# Patient Record
Sex: FEMALE | Race: BLACK OR AFRICAN AMERICAN | NOT HISPANIC OR LATINO
[De-identification: names, ages, dates, MRNs, and addresses within clinical notes are randomized per-mention and may not be internally consistent; named-entity substitution may affect disease eponyms.]

---

## 2022-05-27 ENCOUNTER — APPOINTMENT (OUTPATIENT)
Dept: ORTHOPEDIC SURGERY | Facility: CLINIC | Age: 67
End: 2022-05-27
Payer: MEDICARE

## 2022-05-27 VITALS — BODY MASS INDEX: 34.73 KG/M2 | HEIGHT: 63 IN | WEIGHT: 196 LBS

## 2022-05-27 DIAGNOSIS — Z86.79 PERSONAL HISTORY OF OTHER DISEASES OF THE CIRCULATORY SYSTEM: ICD-10-CM

## 2022-05-27 PROBLEM — Z00.00 ENCOUNTER FOR PREVENTIVE HEALTH EXAMINATION: Status: ACTIVE | Noted: 2022-05-27

## 2022-05-27 PROCEDURE — 99213 OFFICE O/P EST LOW 20 MIN: CPT

## 2022-05-27 RX ORDER — VALSARTAN AND HYDROCHLOROTHIAZIDE 320; 25 MG/1; MG/1
TABLET, FILM COATED ORAL
Refills: 0 | Status: ACTIVE | COMMUNITY

## 2022-05-27 RX ORDER — METAPROTERENOL SULFATE 20 MG/1
TABLET ORAL
Refills: 0 | Status: ACTIVE | COMMUNITY

## 2022-05-27 RX ORDER — CYCLOBENZAPRINE HYDROCHLORIDE 5 MG/1
5 TABLET, FILM COATED ORAL
Qty: 30 | Refills: 0 | Status: COMPLETED | COMMUNITY
Start: 2022-05-27 | End: 2022-06-26

## 2022-05-27 NOTE — DISCUSSION/SUMMARY
[de-identified] : General Dx Discussion\par The patient was advised of the diagnosis. The natural history of the pathology was explained in full to the patient in layman's terms. All questions were answered. The risks and benefits of surgical and non-surgical treatment alternatives were explained in full to the patient.\par \par will get a updated mri to eval for progression of RCT \par f/u after mri \par will try flexeril

## 2022-05-27 NOTE — PHYSICAL EXAM
[Left] : left shoulder [Standing] : standing [Mild] : mild [5 ___] : forward flexion 5[unfilled]/5 [5___] : internal rotation 5[unfilled]/5 [] : negative drop-arm test [TWNoteComboBox7] : active forward flexion 140 degrees [de-identified] : active abduction 130 degrees [TWNoteComboBox6] : internal rotation 60 degrees [de-identified] : external rotation 70 degrees

## 2022-05-27 NOTE — HISTORY OF PRESENT ILLNESS
[Left Arm] : left arm [9] : 9 [5] : 5 [Burning] : burning [Dull/Aching] : dull/aching [Stabbing] : stabbing [] : Post Surgical Visit: no [FreeTextEntry1] : left shoulder  [de-identified] : none

## 2022-05-27 NOTE — REASON FOR VISIT
[FreeTextEntry2] : pt is here to follow up on their left shoulder pain has increased for the past month

## 2022-06-13 ENCOUNTER — APPOINTMENT (OUTPATIENT)
Dept: ORTHOPEDIC SURGERY | Facility: CLINIC | Age: 67
End: 2022-06-13

## 2022-06-13 ENCOUNTER — APPOINTMENT (OUTPATIENT)
Dept: MRI IMAGING | Facility: CLINIC | Age: 67
End: 2022-06-13

## 2022-06-24 ENCOUNTER — APPOINTMENT (OUTPATIENT)
Dept: ORTHOPEDIC SURGERY | Facility: CLINIC | Age: 67
End: 2022-06-24
Payer: MEDICARE

## 2022-06-24 VITALS — HEIGHT: 63 IN | WEIGHT: 196 LBS | BODY MASS INDEX: 34.73 KG/M2

## 2022-06-24 DIAGNOSIS — Z80.9 FAMILY HISTORY OF MALIGNANT NEOPLASM, UNSPECIFIED: ICD-10-CM

## 2022-06-24 DIAGNOSIS — Z82.49 FAMILY HISTORY OF ISCHEMIC HEART DISEASE AND OTHER DISEASES OF THE CIRCULATORY SYSTEM: ICD-10-CM

## 2022-06-24 DIAGNOSIS — Z78.9 OTHER SPECIFIED HEALTH STATUS: ICD-10-CM

## 2022-06-24 PROCEDURE — 99213 OFFICE O/P EST LOW 20 MIN: CPT

## 2022-06-24 RX ORDER — EZETIMIBE 10 MG/1
10 TABLET ORAL
Qty: 90 | Refills: 0 | Status: ACTIVE | COMMUNITY
Start: 2022-04-27

## 2022-06-24 RX ORDER — LEVOCETIRIZINE DIHYDROCHLORIDE 5 MG/1
5 TABLET ORAL
Qty: 20 | Refills: 0 | Status: ACTIVE | COMMUNITY
Start: 2022-05-13

## 2022-06-24 RX ORDER — PRAVASTATIN SODIUM 40 MG/1
40 TABLET ORAL
Qty: 90 | Refills: 0 | Status: ACTIVE | COMMUNITY
Start: 2022-06-21

## 2022-06-24 RX ORDER — CYCLOBENZAPRINE HYDROCHLORIDE 5 MG/1
5 TABLET, FILM COATED ORAL
Qty: 30 | Refills: 0 | Status: ACTIVE | COMMUNITY
Start: 2022-06-24 | End: 1900-01-01

## 2022-06-24 RX ORDER — LEVOTHYROXINE SODIUM 0.07 MG/1
75 TABLET ORAL
Qty: 90 | Refills: 0 | Status: ACTIVE | COMMUNITY
Start: 2022-06-21

## 2022-06-24 NOTE — HISTORY OF PRESENT ILLNESS
[Left Arm] : left arm [7] : 7 [2] : 2 [Dull/Aching] : dull/aching [] : Post Surgical Visit: no [FreeTextEntry1] : left shoulder  [de-identified] : none

## 2022-06-24 NOTE — PHYSICAL EXAM
[Left] : left shoulder [Standing] : standing [Mild] : mild [5 ___] : forward flexion 5[unfilled]/5 [5___] : internal rotation 5[unfilled]/5 [] : negative drop-arm test [TWNoteComboBox7] : active forward flexion 140 degrees [de-identified] : active abduction 130 degrees [TWNoteComboBox6] : internal rotation 60 degrees [de-identified] : external rotation 70 degrees

## 2022-06-24 NOTE — DISCUSSION/SUMMARY
[de-identified] : General Dx discussion\par The patient was advised of the diagnosis. The natural history of the pathology was explained in full to the patient in layman's terms. All questions were answered. The risks and benefits of surgical and non-surgical treatment alternatives were explained in full to the patient. \par \par MRI pictures and report reviewed.\par Recommend home stretching program.\par Flexeril as needed.\par Followup in 2 months.\par \par Entered by KIM Allen acting as scribe.\par \par -\par The documentation recorded by the scribe accurately reflects the service I personally performed and the decisions made by me.\par

## 2022-08-29 ENCOUNTER — RX RENEWAL (OUTPATIENT)
Age: 67
End: 2022-08-29

## 2022-09-02 ENCOUNTER — APPOINTMENT (OUTPATIENT)
Dept: ORTHOPEDIC SURGERY | Facility: CLINIC | Age: 67
End: 2022-09-02

## 2022-09-07 ENCOUNTER — RX RENEWAL (OUTPATIENT)
Age: 67
End: 2022-09-07

## 2023-02-17 ENCOUNTER — APPOINTMENT (OUTPATIENT)
Dept: ORTHOPEDIC SURGERY | Facility: CLINIC | Age: 68
End: 2023-02-17
Payer: MEDICARE

## 2023-02-17 VITALS — HEIGHT: 63 IN | WEIGHT: 196 LBS | BODY MASS INDEX: 34.73 KG/M2

## 2023-02-17 PROCEDURE — 73564 X-RAY EXAM KNEE 4 OR MORE: CPT | Mod: 50

## 2023-02-17 PROCEDURE — 99214 OFFICE O/P EST MOD 30 MIN: CPT

## 2023-02-17 RX ORDER — HYALURONATE SODIUM 30 MG/2 ML
30 SYRINGE (ML) INTRAARTICULAR
Qty: 8 | Refills: 0 | Status: COMPLETED | COMMUNITY
Start: 2023-02-17 | End: 2023-03-17

## 2023-02-17 NOTE — PHYSICAL EXAM
[Left] : left shoulder [Standing] : standing [Mild] : mild [5 ___] : forward flexion 5[unfilled]/5 [5___] : internal rotation 5[unfilled]/5 [Bilateral] : knee bilaterally [TWNoteComboBox6] : internal rotation 60 degrees [de-identified] : external rotation 70 degrees [] : non-antalgic [TWNoteComboBox7] : flexion 100 degrees [de-identified] : extension 5 degrees

## 2023-02-17 NOTE — DISCUSSION/SUMMARY
[de-identified] : General Dx discussion\par The patient was advised of the diagnosis. The natural history of the pathology was explained in full to the patient in layman's terms. All questions were answered. The risks and benefits of surgical and non-surgical treatment alternatives were explained in full to the patient. \par \par Case discussed.\par She will be sent for an MRI of her shoulder to eval the partial tear seen in 2021/ R/O a complete tear.\par Recommend repeating Supartz injections for her knees.  Auth requested.\par Follow up after MRI and with Supartz auth to begin series.\par \par Entered by KMI Allen acting as scribe.\par -\par The documentation recorded by the scribe accurately reflects the service I personally performed and the decisions made by me.\par

## 2023-02-17 NOTE — HISTORY OF PRESENT ILLNESS
[9] : 9 [5] : 5 [Dull/Aching] : dull/aching [Constant] : constant [Rest] : rest [] : Post Surgical Visit: no [FreeTextEntry1] : left shoulder [FreeTextEntry5] : Patient is here today for follow up left shoulder. Symptoms continue. Patient states pain and soreness that radiates to the neck [FreeTextEntry6] : soreness [FreeTextEntry9] : tylenol [de-identified] : motion

## 2023-02-17 NOTE — REASON FOR VISIT
[FreeTextEntry2] : follow up left shoulder. She continues with pain which sometimes wakes her at night. Flexeril did not help.Had a gel one injection in May 2021 which helped. MRI from 9/21 showed a partial RTC tear.Also here for bilateral knee pain. Has had Supartz in the past with good relief of symptoms. Last series was in October of 2021.

## 2023-03-09 RX ORDER — HYLAN G-F 20 16MG/2ML
16 SYRINGE (ML) INTRAARTICULAR WEEKLY
Qty: 6 | Refills: 0 | Status: COMPLETED | COMMUNITY
Start: 2023-03-09 | End: 2023-03-30

## 2023-03-30 ENCOUNTER — APPOINTMENT (OUTPATIENT)
Dept: ORTHOPEDIC SURGERY | Facility: CLINIC | Age: 68
End: 2023-03-30
Payer: MEDICARE

## 2023-03-30 VITALS — WEIGHT: 189 LBS | HEIGHT: 63 IN | BODY MASS INDEX: 33.49 KG/M2

## 2023-03-30 DIAGNOSIS — M75.02 ADHESIVE CAPSULITIS OF LEFT SHOULDER: ICD-10-CM

## 2023-03-30 PROCEDURE — 20610 DRAIN/INJ JOINT/BURSA W/O US: CPT | Mod: 50

## 2023-03-30 PROCEDURE — 99212 OFFICE O/P EST SF 10 MIN: CPT | Mod: 25

## 2023-03-30 NOTE — HISTORY OF PRESENT ILLNESS
[1] : 1 [Synvisc] : Synvisc [9] : 9 [5] : 5 [Dull/Aching] : dull/aching [Constant] : constant [Rest] : rest [de-identified] : #1 synvisc for bilateral knees [] : Post Surgical Visit: no [FreeTextEntry1] : left shoulder [FreeTextEntry5] : Patient is here today for follow up left shoulder. Symptoms continue. Patient states pain and soreness that radiates to the neck [FreeTextEntry6] : soreness [FreeTextEntry9] : tylenol [de-identified] : motion

## 2023-03-30 NOTE — DISCUSSION/SUMMARY
[de-identified] : General Dx discussion\par The patient was advised of the diagnosis. The natural history of the pathology was explained in full to the patient in layman's terms. All questions were answered. The risks and benefits of surgical and non-surgical treatment alternatives were explained in full to the patient. \par \par Case Discussed.\par She will be sent for an MRI of her shoulder to eval the partial tear seen in 2021/ R/O a complete tear.\par Synvisc today b/l knees and tolerated well. \par \par Entered by KIM Martínez acting as scribe.\par -\par The documentation recorded by the scribe accurately reflects the service I personally performed and the decisions made by me.\par

## 2023-03-30 NOTE — PHYSICAL EXAM
[Left] : left shoulder [Standing] : standing [Mild] : mild [5 ___] : forward flexion 5[unfilled]/5 [5___] : internal rotation 5[unfilled]/5 [Bilateral] : knee bilaterally [TWNoteComboBox6] : internal rotation 60 degrees [de-identified] : external rotation 70 degrees [] : non-antalgic [TWNoteComboBox7] : flexion 100 degrees [de-identified] : extension 5 degrees

## 2023-03-30 NOTE — PROCEDURE
[Large Joint Injection] : Large joint injection [Bilateral] : bilaterally of the [Knee] : knee [Pain] : pain [Inflammation] : inflammation [X-ray evidence of Osteoarthritis on this or prior visit] : x-ray evidence of Osteoarthritis on this or prior visit [Alcohol] : alcohol [Betadine] : betadine [Ethyl Chloride sprayed topically] : ethyl chloride sprayed topically [Sterile technique used] : sterile technique used [Synvisc (16mg)] : 16mg of Synvisc [#1] : series #1 [] : Patient tolerated procedure well [Call if redness, pain or fever occur] : call if redness, pain or fever occur [Apply ice for 15min out of every hour for the next 12-24 hours as tolerated] : apply ice for 15 minutes out of every hour for the next 12-24 hours as tolerated [Previous OTC use and PT nontherapeutic] : patient has tried OTC's including aspirin, Ibuprofen, Aleve, etc or prescription NSAIDS, and/or exercises at home and/or physical therapy without satisfactory response [Patient had decreased mobility in the joint] : patient had decreased mobility in the joint [Risks, benefits, alternatives discussed / Verbal consent obtained] : the risks benefits, and alternatives have been discussed, and verbal consent was obtained [Prior failure or difficult injection] : prior failure or difficult injection [All ultrasound images have been permanently captured and stored accordingly in our picture archiving and communication system] : All ultrasound images have been permanently captured and stored accordingly in our picture archiving and communication system

## 2023-04-05 ENCOUNTER — APPOINTMENT (OUTPATIENT)
Dept: MRI IMAGING | Facility: CLINIC | Age: 68
End: 2023-04-05

## 2023-04-06 ENCOUNTER — APPOINTMENT (OUTPATIENT)
Dept: ORTHOPEDIC SURGERY | Facility: CLINIC | Age: 68
End: 2023-04-06
Payer: MEDICARE

## 2023-04-06 VITALS — HEIGHT: 63 IN | BODY MASS INDEX: 33.49 KG/M2 | WEIGHT: 189 LBS

## 2023-04-06 PROCEDURE — 20611 DRAIN/INJ JOINT/BURSA W/US: CPT | Mod: 50

## 2023-04-06 PROCEDURE — 99024 POSTOP FOLLOW-UP VISIT: CPT

## 2023-04-06 NOTE — DISCUSSION/SUMMARY
[de-identified] : General Dx discussion\par The patient was advised of the diagnosis. The natural history of the pathology was explained in full to the patient in layman's terms. All questions were answered. The risks and benefits of surgical and non-surgical treatment alternatives were explained in full to the patient. \par \par Case Discussed.\par Synvisc today b/l knees and tolerated well. \par f/u 1 week to continue series. \par \par Entered by KIM Martínez acting as scribe.\par -\par The documentation recorded by the scribe accurately reflects the service I personally performed and the decisions made by me.\par

## 2023-04-06 NOTE — PROCEDURE
[Large Joint Injection] : Large joint injection [Bilateral] : bilaterally of the [Knee] : knee [Pain] : pain [Inflammation] : inflammation [X-ray evidence of Osteoarthritis on this or prior visit] : x-ray evidence of Osteoarthritis on this or prior visit [Alcohol] : alcohol [Betadine] : betadine [Ethyl Chloride sprayed topically] : ethyl chloride sprayed topically [Sterile technique used] : sterile technique used [Synvisc (16mg)] : 16mg of Synvisc [#2] : series #2 [] : Patient tolerated procedure well [Call if redness, pain or fever occur] : call if redness, pain or fever occur [Apply ice for 15min out of every hour for the next 12-24 hours as tolerated] : apply ice for 15 minutes out of every hour for the next 12-24 hours as tolerated [Previous OTC use and PT nontherapeutic] : patient has tried OTC's including aspirin, Ibuprofen, Aleve, etc or prescription NSAIDS, and/or exercises at home and/or physical therapy without satisfactory response [Patient had decreased mobility in the joint] : patient had decreased mobility in the joint [Risks, benefits, alternatives discussed / Verbal consent obtained] : the risks benefits, and alternatives have been discussed, and verbal consent was obtained [Prior failure or difficult injection] : prior failure or difficult injection [All ultrasound images have been permanently captured and stored accordingly in our picture archiving and communication system] : All ultrasound images have been permanently captured and stored accordingly in our picture archiving and communication system

## 2023-04-06 NOTE — HISTORY OF PRESENT ILLNESS
[3] : 3 [de-identified] : synvisc for bilateral knees [] : no [de-identified] : 3/30/23 [TWNoteComboBox1] : 30%

## 2023-04-06 NOTE — PHYSICAL EXAM
[Left] : left shoulder [Standing] : standing [Mild] : mild [5 ___] : forward flexion 5[unfilled]/5 [5___] : internal rotation 5[unfilled]/5 [Bilateral] : knee bilaterally [TWNoteComboBox6] : internal rotation 60 degrees [de-identified] : external rotation 70 degrees [] : non-antalgic [TWNoteComboBox7] : flexion 100 degrees [de-identified] : extension 5 degrees

## 2023-04-24 ENCOUNTER — APPOINTMENT (OUTPATIENT)
Dept: ORTHOPEDIC SURGERY | Facility: CLINIC | Age: 68
End: 2023-04-24
Payer: MEDICARE

## 2023-04-24 VITALS — WEIGHT: 189 LBS | BODY MASS INDEX: 33.49 KG/M2 | HEIGHT: 63 IN

## 2023-04-24 PROCEDURE — 20611 DRAIN/INJ JOINT/BURSA W/US: CPT | Mod: RT

## 2023-04-24 PROCEDURE — 99024 POSTOP FOLLOW-UP VISIT: CPT

## 2023-04-24 NOTE — HISTORY OF PRESENT ILLNESS
[3] : 3 [Synvisc] : Synvisc [] : no [FreeTextEntry1] : both knees [de-identified] : 4/6/23 [de-identified] : both knees

## 2023-04-24 NOTE — DISCUSSION/SUMMARY
[de-identified] : General Dx discussion\par The patient was advised of the diagnosis. The natural history of the pathology was explained in full to the patient in layman's terms. All questions were answered. The risks and benefits of surgical and non-surgical treatment alternatives were explained in full to the patient. \par \par Case discussed.\par Synvisc tolerated well.\par Follow up as needed.\par \par Entered by KIM Allen acting as scribe.\par -\par The documentation recorded by the scribe accurately reflects the service I personally performed and the decisions made by me.\par \par

## 2023-04-24 NOTE — PROCEDURE
[Large Joint Injection] : Large joint injection [Bilateral] : bilaterally of the [Knee] : knee [Pain] : pain [Inflammation] : inflammation [X-ray evidence of Osteoarthritis on this or prior visit] : x-ray evidence of Osteoarthritis on this or prior visit [Alcohol] : alcohol [Betadine] : betadine [Ethyl Chloride sprayed topically] : ethyl chloride sprayed topically [Sterile technique used] : sterile technique used [Synvisc (16mg)] : 16mg of Synvisc [#3] : series #3 [] : Patient tolerated procedure well [Call if redness, pain or fever occur] : call if redness, pain or fever occur [Apply ice for 15min out of every hour for the next 12-24 hours as tolerated] : apply ice for 15 minutes out of every hour for the next 12-24 hours as tolerated [Previous OTC use and PT nontherapeutic] : patient has tried OTC's including aspirin, Ibuprofen, Aleve, etc or prescription NSAIDS, and/or exercises at home and/or physical therapy without satisfactory response [Patient had decreased mobility in the joint] : patient had decreased mobility in the joint [Risks, benefits, alternatives discussed / Verbal consent obtained] : the risks benefits, and alternatives have been discussed, and verbal consent was obtained [Prior failure or difficult injection] : prior failure or difficult injection [All ultrasound images have been permanently captured and stored accordingly in our picture archiving and communication system] : All ultrasound images have been permanently captured and stored accordingly in our picture archiving and communication system [Visualization of the needle and placement of injection was performed without complication] : visualization of the needle and placement of injection was performed without complication

## 2023-04-24 NOTE — PHYSICAL EXAM
[Bilateral] : knee bilaterally [] : non-antalgic [TWNoteComboBox7] : flexion 100 degrees [de-identified] : extension 5 degrees

## 2023-05-08 ENCOUNTER — APPOINTMENT (OUTPATIENT)
Dept: ORTHOPEDIC SURGERY | Facility: CLINIC | Age: 68
End: 2023-05-08
Payer: MEDICARE

## 2023-05-08 VITALS — WEIGHT: 189 LBS | BODY MASS INDEX: 33.49 KG/M2 | HEIGHT: 63 IN

## 2023-05-08 PROCEDURE — 20550 NJX 1 TENDON SHEATH/LIGAMENT: CPT | Mod: LT

## 2023-05-08 PROCEDURE — 99213 OFFICE O/P EST LOW 20 MIN: CPT | Mod: 25

## 2023-05-08 PROCEDURE — 73130 X-RAY EXAM OF HAND: CPT | Mod: LT

## 2023-05-17 NOTE — IMAGING
[Left] : left hand [de-identified] : left middle finger TTP a1 pulley +triggering otherwise farom NVID\par Pt with left middle finger MCP with swelling / ttp and no ligamentous laxity.  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

## 2023-05-17 NOTE — ASSESSMENT
[FreeTextEntry1] : The patient was advised of the diagnosis. The natural history of the pathology was explained in full to the patient in layman's terms. All questions were answered. The risks and benefits of surgical and non-surgical treatment alternatives were explained in full to the patient.\par \par \par Pt provided left middle trigger finger CSI #1 today.\par We reviewed the anatomy of the flexor sheath and pathology of trigger fingers with the use of drawings and discussion.  We discussed the treatment options including splinting/nsaids, injection and surgery.  We discussed that too many injections may lead to weakening o the tendon/tendon rupture and the safety of two injections. After a discussion of the risks, benefits and alternatives along with the expectations, the patient was amenable to injection.  The indication for injection is pain and inflammation.  The skin overlying the tendon sheath/A1 pulley site was prepared with alcohol and ethyl chloride was sprayed topically.  Sterile technique was used. An injection of 1ml of lidocaine and 6mg of betamethasone was used.  The patient was instructed to call if redness, pain or fever occur.  They ay apply ice for 15 minutes eery hour for the next 12-24 hours as tolerated.  .  The patient understands that it may take 2-5 days to see a noticeable difference.  Sterile Band-Aid was applied.\par \par \par Pt instructed to have b/w with PCP performed to r/o auto-immune disorder due to noted MCP joint arthritis.\par RTO in 3 weeks. \par \par I, Dr Robert Morillo, personally performed the evaluation and management services for this patient. The E/M includes conducting the initial examination, assessing all conditions, and establishing the plan of care.  Today, My PEPE Nahid Beavers, was here to observe my evaluation and management services for this patient to be followed going forward

## 2023-05-17 NOTE — REASON FOR VISIT
[FreeTextEntry2] : 05/08/2023 :YFNDUANE VILLALTA , a 68 year old female, presents today for left hand 2 trigger fingers middle and ring finger. \par

## 2023-05-17 NOTE — HISTORY OF PRESENT ILLNESS
[7] : 7 [2] : 2 [Dull/Aching] : dull/aching [Localized] : localized [Intermittent] : intermittent [Household chores] : household chores [Leisure] : leisure [Sleep] : sleep [Exercising] : exercising [Retired] : Work status: retired [de-identified] : 5/8/2023: rhd 69 yo female here with left hand pain and locking of the left ring and middle fingers.\par Pt also complains of intermittent tingling to the median nerve distribution (previous hx of left carpal tunnel release). \par \par PMH: hypothyroid, htn, hyperlipidemia.\par Allergies: NKDA. [] : no

## 2023-05-25 NOTE — IMAGING
Signed and faxed.     [Bilateral] : knee bilaterally [All Views] : anteroposterior, lateral, skyline, and anteroposterior standing [Moderate tricompartmental OA lateral narrowing] : Moderate tricompartmental OA lateral narrowing

## 2023-05-30 ENCOUNTER — APPOINTMENT (OUTPATIENT)
Dept: ORTHOPEDIC SURGERY | Facility: CLINIC | Age: 68
End: 2023-05-30
Payer: MEDICARE

## 2023-05-30 VITALS — HEIGHT: 63 IN | BODY MASS INDEX: 33.49 KG/M2 | WEIGHT: 189 LBS

## 2023-05-30 DIAGNOSIS — M19.032 PRIMARY OSTEOARTHRITIS, LEFT WRIST: ICD-10-CM

## 2023-05-30 DIAGNOSIS — M19.242 SECONDARY OSTEOARTHRITIS, LEFT HAND: ICD-10-CM

## 2023-05-30 DIAGNOSIS — M65.332 TRIGGER FINGER, LEFT MIDDLE FINGER: ICD-10-CM

## 2023-05-30 PROCEDURE — 99213 OFFICE O/P EST LOW 20 MIN: CPT

## 2023-05-30 NOTE — IMAGING
[de-identified] : left middle finger with mild TTP a1 pulley and +triggering otherwise farom NVID\par Pt with left middle finger MCP with swelling / ttp and no ligamentous laxity.

## 2023-05-30 NOTE — HISTORY OF PRESENT ILLNESS
[de-identified] : 5/30/23:  Pt has improved since last visit.\par \par 5/8/2023: rhd 67 yo female here with left hand pain and locking of the left ring and middle fingers.\par Pt also complains of intermittent tingling to the median nerve distribution (previous hx of left carpal tunnel release). \par \par PMH: hypothyroid, htn, hyperlipidemia.\par Allergies: NKDA. [FreeTextEntry5] : Pain has improved. Recieved CSI #1 on 5/8/23

## 2024-02-12 ENCOUNTER — APPOINTMENT (OUTPATIENT)
Dept: ORTHOPEDIC SURGERY | Facility: CLINIC | Age: 69
End: 2024-02-12
Payer: MEDICARE

## 2024-02-12 VITALS — HEIGHT: 63 IN | BODY MASS INDEX: 34.02 KG/M2 | WEIGHT: 192 LBS

## 2024-02-12 DIAGNOSIS — M75.112 INCOMPLETE ROTATOR CUFF TEAR OR RUPTURE OF LEFT SHOULDER, NOT SPECIFIED AS TRAUMATIC: ICD-10-CM

## 2024-02-12 PROCEDURE — 99213 OFFICE O/P EST LOW 20 MIN: CPT

## 2024-02-13 DIAGNOSIS — M17.12 UNILATERAL PRIMARY OSTEOARTHRITIS, LEFT KNEE: ICD-10-CM

## 2024-02-13 DIAGNOSIS — M17.11 UNILATERAL PRIMARY OSTEOARTHRITIS, RIGHT KNEE: ICD-10-CM

## 2024-02-13 RX ORDER — HYLAN G-F 20 16MG/2ML
16 SYRINGE (ML) INTRAARTICULAR
Qty: 6 | Refills: 0 | Status: COMPLETED | COMMUNITY
Start: 2024-02-13 | End: 2024-03-05

## 2024-02-13 NOTE — PHYSICAL EXAM
[Bilateral] : knee bilaterally [Left] : left shoulder [Standing] : standing [Mild] : mild [5 ___] : forward flexion 5[unfilled]/5 [5___] : internal rotation 5[unfilled]/5 [TWNoteComboBox6] : internal rotation 60 degrees [de-identified] : external rotation 70 degrees [] : non-antalgic [TWNoteComboBox7] : flexion 100 degrees [de-identified] : extension 5 degrees

## 2024-02-13 NOTE — HISTORY OF PRESENT ILLNESS
[8] : 8 [3] : 3 [Dull/Aching] : dull/aching [Intermittent] : intermittent [Sleep] : sleep [Injection therapy] : injection therapy [Walking] : walking [Retired] : Work status: retired [de-identified] : Patient is here for a follow up on her knees. Pain is returning, left is worse. Also c/o worsening left shoulder pain. Never got the MRI. [] : no [FreeTextEntry1] : Knees [de-identified] : none

## 2024-02-13 NOTE — DISCUSSION/SUMMARY
[de-identified] : General Dx discussion The patient was advised of the diagnosis. The natural history of the pathology was explained in full to the patient in layman's terms. All questions were answered. The risks and benefits of surgical and non-surgical treatment alternatives were explained in full to the patient.   Case discussed. She will be sent for an MRI of her shoulder to r/o a RTC tear. Recommend and request auth for Synvisc injections. Follow up with auth to begin series.  Entered by KIM Allen acting as scribe. - The documentation recorded by the scribe accurately reflects the service I personally performed and the decisions made by me.

## 2024-04-29 ENCOUNTER — APPOINTMENT (OUTPATIENT)
Dept: ORTHOPEDIC SURGERY | Facility: CLINIC | Age: 69
End: 2024-04-29
Payer: MEDICARE

## 2024-04-29 VITALS — WEIGHT: 192 LBS | BODY MASS INDEX: 34.02 KG/M2 | HEIGHT: 63 IN

## 2024-04-29 DIAGNOSIS — M75.122 COMPLETE ROTATOR CUFF TEAR OR RUPTURE OF LEFT SHOULDER, NOT SPECIFIED AS TRAUMATIC: ICD-10-CM

## 2024-04-29 PROCEDURE — 99213 OFFICE O/P EST LOW 20 MIN: CPT

## 2024-04-29 NOTE — PHYSICAL EXAM
[Left] : left shoulder [Standing] : standing [Mild] : mild [5 ___] : forward flexion 5[unfilled]/5 [5___] : internal rotation 5[unfilled]/5 [] : negative drop-arm test [TWNoteComboBox7] : active forward flexion 140 degrees [de-identified] : active abduction 130 degrees [TWNoteComboBox6] : internal rotation 60 degrees [de-identified] : external rotation 70 degrees

## 2024-04-29 NOTE — HISTORY OF PRESENT ILLNESS
[8] : 8 [3] : 3 [Dull/Aching] : dull/aching [Intermittent] : intermittent [Sleep] : sleep [Injection therapy] : injection therapy [Walking] : walking [Retired] : Work status: retired [de-identified] : MRI review Left shoulder  [] : Post Surgical Visit: no [FreeTextEntry1] : Knees [de-identified] : none

## 2024-04-29 NOTE — DISCUSSION/SUMMARY
[de-identified] : General Dx discussion The patient was advised of the diagnosis. The natural history of the pathology was explained in full to the patient in layman's terms. All questions were answered. The risks and benefits of surgical and non-surgical treatment alternatives were explained in full to the patient.   MRI pictures and report reviewed. Case discussed. Recommend and request auth for Synvisc injections for her shoulder. Follow up with auth to begin series.  Entered by KIM Allen acting as scribe. - The documentation recorded by the scribe accurately reflects the service I personally performed and the decisions made by me.

## 2024-04-29 NOTE — DATA REVIEWED
[MRI] : MRI [Left] : left [Shoulder] : shoulder [Report was reviewed and noted in the chart] : The report was reviewed and noted in the chart [I reviewed the films/CD] : I reviewed the films/CD [FreeTextEntry1] : AC joint OA RTC tendinopathy and fraying with 6mm articular insertional supraspinatus tear. Diffuse labral tear. Biceps tendinopathy. Capsular thickening. Stable nonacute glenoid impaction fracture.

## 2024-04-30 RX ORDER — HYLAN G-F 20 16MG/2ML
16 SYRINGE (ML) INTRAARTICULAR WEEKLY
Qty: 3 | Refills: 0 | Status: ACTIVE | COMMUNITY
Start: 2024-04-30 | End: 1900-01-01

## 2024-05-24 ENCOUNTER — APPOINTMENT (OUTPATIENT)
Dept: ORTHOPEDIC SURGERY | Facility: CLINIC | Age: 69
End: 2024-05-24
Payer: MEDICARE

## 2024-05-24 VITALS — BODY MASS INDEX: 34.02 KG/M2 | WEIGHT: 192 LBS | HEIGHT: 63 IN

## 2024-05-24 PROCEDURE — 99213 OFFICE O/P EST LOW 20 MIN: CPT | Mod: 25

## 2024-05-24 PROCEDURE — 20611 DRAIN/INJ JOINT/BURSA W/US: CPT | Mod: LT

## 2024-05-24 NOTE — PROCEDURE
[Large Joint Injection] : Large joint injection [Left] : of the left [Glenohumeral Joint] : glenohumeral joint [Pain] : pain [X-ray evidence of Osteoarthritis on this or prior visit] : x-ray evidence of Osteoarthritis on this or prior visit [Alcohol] : alcohol [Betadine] : betadine [Ethyl Chloride sprayed topically] : ethyl chloride sprayed topically [Sterile technique used] : sterile technique used [Synvisc-one (48mg)] : 48mg of Synvisc-one [#1] : series #1 [] : Patient tolerated procedure well [Call if redness, pain or fever occur] : call if redness, pain or fever occur [Apply ice for 15min out of every hour for the next 12-24 hours as tolerated] : apply ice for 15 minutes out of every hour for the next 12-24 hours as tolerated [Previous OTC use and PT nontherapeutic] : patient has tried OTC's including aspirin, Ibuprofen, Aleve, etc or prescription NSAIDS, and/or exercises at home and/or physical therapy without satisfactory response [Patient had decreased mobility in the joint] : patient had decreased mobility in the joint [Risks, benefits, alternatives discussed / Verbal consent obtained] : the risks benefits, and alternatives have been discussed, and verbal consent was obtained [Glenohmeral injection] : glenohumeral injection [All ultrasound images have been permanently captured and stored accordingly in our picture archiving and communication system] : All ultrasound images have been permanently captured and stored accordingly in our picture archiving and communication system [Visualization of the needle and placement of injection was performed without complication] : visualization of the needle and placement of injection was performed without complication

## 2024-05-24 NOTE — PHYSICAL EXAM
[Left] : left shoulder [Standing] : standing [Mild] : mild [5 ___] : forward flexion 5[unfilled]/5 [5___] : internal rotation 5[unfilled]/5 [] : negative drop-arm test [TWNoteComboBox7] : active forward flexion 140 degrees [de-identified] : active abduction 130 degrees [TWNoteComboBox6] : internal rotation 60 degrees [de-identified] : external rotation 70 degrees

## 2024-05-24 NOTE — DISCUSSION/SUMMARY
[de-identified] : General Dx Discussion The patient was advised of the diagnosis. The natural history of the pathology was explained in full to the patient in layman's terms. All questions were answered. The risks and benefits of surgical and non-surgical treatment alternatives were explained in full to the patient.  Case discussed. Synvisc tolerated well. Follow up in 1 week to continue the series.  Entered by KIM Allen acting as scribe. - The documentation recorded by the scribe accurately reflects the service I personally performed and the decisions made by me. 35 [id number]

## 2024-05-24 NOTE — HISTORY OF PRESENT ILLNESS
[1] : 1 [Synvisc] : Synvisc [de-identified] : Patient is here today for injection #1 of Synvisc for the left shoulder  [] : This patient has had an injection before: no [de-identified] : Left shoulder

## 2024-05-31 ENCOUNTER — APPOINTMENT (OUTPATIENT)
Dept: ORTHOPEDIC SURGERY | Facility: CLINIC | Age: 69
End: 2024-05-31
Payer: MEDICARE

## 2024-05-31 VITALS — HEIGHT: 63 IN | BODY MASS INDEX: 34.02 KG/M2 | WEIGHT: 192 LBS

## 2024-05-31 PROCEDURE — 20611 DRAIN/INJ JOINT/BURSA W/US: CPT | Mod: LT

## 2024-05-31 PROCEDURE — 99024 POSTOP FOLLOW-UP VISIT: CPT

## 2024-05-31 NOTE — HISTORY OF PRESENT ILLNESS
[Retired] : Work status: retired [2] : 2 [Synvisc] : Synvisc [de-identified] : Patient is here today for injection # 2 of Synvisc for the left shoulder  [] : Post Surgical Visit: no [de-identified] : Left shoulder

## 2024-05-31 NOTE — PHYSICAL EXAM
[Left] : left shoulder [Standing] : standing [Mild] : mild [5 ___] : forward flexion 5[unfilled]/5 [5___] : internal rotation 5[unfilled]/5 [] : negative drop-arm test [TWNoteComboBox7] : active forward flexion 140 degrees [de-identified] : active abduction 130 degrees [TWNoteComboBox6] : internal rotation 60 degrees [de-identified] : external rotation 70 degrees

## 2024-05-31 NOTE — PROCEDURE
[Large Joint Injection] : Large joint injection [Left] : of the left [Glenohumeral Joint] : glenohumeral joint [Pain] : pain [X-ray evidence of Osteoarthritis on this or prior visit] : x-ray evidence of Osteoarthritis on this or prior visit [Alcohol] : alcohol [Betadine] : betadine [Ethyl Chloride sprayed topically] : ethyl chloride sprayed topically [Sterile technique used] : sterile technique used [Synvisc-one (48mg)] : 48mg of Synvisc-one [#2] : series #2 [] : Patient tolerated procedure well [Call if redness, pain or fever occur] : call if redness, pain or fever occur [Apply ice for 15min out of every hour for the next 12-24 hours as tolerated] : apply ice for 15 minutes out of every hour for the next 12-24 hours as tolerated [Previous OTC use and PT nontherapeutic] : patient has tried OTC's including aspirin, Ibuprofen, Aleve, etc or prescription NSAIDS, and/or exercises at home and/or physical therapy without satisfactory response [Patient had decreased mobility in the joint] : patient had decreased mobility in the joint [Risks, benefits, alternatives discussed / Verbal consent obtained] : the risks benefits, and alternatives have been discussed, and verbal consent was obtained [Glenohmeral injection] : glenohumeral injection [All ultrasound images have been permanently captured and stored accordingly in our picture archiving and communication system] : All ultrasound images have been permanently captured and stored accordingly in our picture archiving and communication system [Visualization of the needle and placement of injection was performed without complication] : visualization of the needle and placement of injection was performed without complication

## 2024-05-31 NOTE — DISCUSSION/SUMMARY
[de-identified] : General Dx Discussion The patient was advised of the diagnosis. The natural history of the pathology was explained in full to the patient in layman's terms. All questions were answered. The risks and benefits of surgical and non-surgical treatment alternatives were explained in full to the patient.  Case discussed. Synvisc tolerated well. Follow up in 1 week to continue the series.  Entered by KIM Allen acting as scribe. - The documentation recorded by the scribe accurately reflects the service I personally performed and the decisions made by me.

## 2024-06-10 ENCOUNTER — APPOINTMENT (OUTPATIENT)
Dept: ORTHOPEDIC SURGERY | Facility: CLINIC | Age: 69
End: 2024-06-10
Payer: MEDICARE

## 2024-06-10 VITALS — WEIGHT: 192 LBS | BODY MASS INDEX: 34.02 KG/M2 | HEIGHT: 63 IN

## 2024-06-10 DIAGNOSIS — M19.012 PRIMARY OSTEOARTHRITIS, LEFT SHOULDER: ICD-10-CM

## 2024-06-10 PROCEDURE — 20611 DRAIN/INJ JOINT/BURSA W/US: CPT | Mod: LT

## 2024-06-10 PROCEDURE — 99024 POSTOP FOLLOW-UP VISIT: CPT

## 2024-06-10 NOTE — DISCUSSION/SUMMARY
[de-identified] : General Dx Discussion The patient was advised of the diagnosis. The natural history of the pathology was explained in full to the patient in layman's terms. All questions were answered. The risks and benefits of surgical and non-surgical treatment alternatives were explained in full to the patient.  Case discussed. Synvisc tolerated well. Follow up in 6 weeks.  Entered by KIM Allen acting as scribe. - The documentation recorded by the scribe accurately reflects the service I personally performed and the decisions made by me.

## 2024-06-10 NOTE — HISTORY OF PRESENT ILLNESS
[] : This patient has had an injection before: yes [3] : 3 [Synvisc] : Synvisc [de-identified] : #3 left shoulder synvisc [de-identified] : 5/31/24

## 2024-06-10 NOTE — PHYSICAL EXAM
[Left] : left shoulder [Standing] : standing [Mild] : mild [5 ___] : forward flexion 5[unfilled]/5 [5___] : internal rotation 5[unfilled]/5 [] : negative drop-arm test [TWNoteComboBox7] : active forward flexion 140 degrees [de-identified] : active abduction 130 degrees [TWNoteComboBox6] : internal rotation 60 degrees [de-identified] : external rotation 70 degrees

## 2024-06-10 NOTE — PROCEDURE
[Large Joint Injection] : Large joint injection [Left] : of the left [Glenohumeral Joint] : glenohumeral joint [Pain] : pain [X-ray evidence of Osteoarthritis on this or prior visit] : x-ray evidence of Osteoarthritis on this or prior visit [Alcohol] : alcohol [Betadine] : betadine [Ethyl Chloride sprayed topically] : ethyl chloride sprayed topically [Sterile technique used] : sterile technique used [Synvisc-one (48mg)] : 48mg of Synvisc-one [#3] : series #3 [] : Patient tolerated procedure well [Call if redness, pain or fever occur] : call if redness, pain or fever occur [Apply ice for 15min out of every hour for the next 12-24 hours as tolerated] : apply ice for 15 minutes out of every hour for the next 12-24 hours as tolerated [Previous OTC use and PT nontherapeutic] : patient has tried OTC's including aspirin, Ibuprofen, Aleve, etc or prescription NSAIDS, and/or exercises at home and/or physical therapy without satisfactory response [Patient had decreased mobility in the joint] : patient had decreased mobility in the joint [Risks, benefits, alternatives discussed / Verbal consent obtained] : the risks benefits, and alternatives have been discussed, and verbal consent was obtained [Glenohmeral injection] : glenohumeral injection [All ultrasound images have been permanently captured and stored accordingly in our picture archiving and communication system] : All ultrasound images have been permanently captured and stored accordingly in our picture archiving and communication system [Visualization of the needle and placement of injection was performed without complication] : visualization of the needle and placement of injection was performed without complication

## 2024-07-25 ENCOUNTER — APPOINTMENT (OUTPATIENT)
Dept: ORTHOPEDIC SURGERY | Facility: CLINIC | Age: 69
End: 2024-07-25

## 2024-09-12 ENCOUNTER — APPOINTMENT (OUTPATIENT)
Dept: ORTHOPEDIC SURGERY | Facility: CLINIC | Age: 69
End: 2024-09-12
Payer: MEDICARE

## 2024-09-12 VITALS — HEIGHT: 63 IN | BODY MASS INDEX: 34.02 KG/M2 | WEIGHT: 192 LBS

## 2024-09-12 PROCEDURE — 20611 DRAIN/INJ JOINT/BURSA W/US: CPT | Mod: 50

## 2024-09-12 PROCEDURE — 99212 OFFICE O/P EST SF 10 MIN: CPT | Mod: 25

## 2024-09-12 NOTE — HISTORY OF PRESENT ILLNESS
[1] : 1 [Synvisc] : Synvisc [de-identified] : Patient is here to begin synvisc injections in both knees.

## 2024-09-12 NOTE — PHYSICAL EXAM
[Left] : left shoulder [Standing] : standing [Mild] : mild [5 ___] : forward flexion 5[unfilled]/5 [5___] : internal rotation 5[unfilled]/5 [] : negative drop-arm test [TWNoteComboBox7] : active forward flexion 140 degrees [de-identified] : active abduction 130 degrees [TWNoteComboBox6] : internal rotation 60 degrees [de-identified] : external rotation 70 degrees

## 2024-09-12 NOTE — DISCUSSION/SUMMARY
[de-identified] : General Dx Discussion The patient was advised of the diagnosis. The natural history of the pathology was explained in full to the patient in layman's terms. All questions were answered. The risks and benefits of surgical and non-surgical treatment alternatives were explained in full to the patient.  Case discussed. Synvisc tolerated well. Follow up in 1 weeks.  Entered by KIM Martínez acting as scribe. - The documentation recorded by the scribe accurately reflects the service I personally performed and the decisions made by me.

## 2024-09-19 ENCOUNTER — APPOINTMENT (OUTPATIENT)
Dept: ORTHOPEDIC SURGERY | Facility: CLINIC | Age: 69
End: 2024-09-19

## 2024-09-19 VITALS — WEIGHT: 192 LBS | BODY MASS INDEX: 34.02 KG/M2 | HEIGHT: 63 IN

## 2024-09-19 DIAGNOSIS — M17.12 UNILATERAL PRIMARY OSTEOARTHRITIS, LEFT KNEE: ICD-10-CM

## 2024-09-19 DIAGNOSIS — M17.11 UNILATERAL PRIMARY OSTEOARTHRITIS, RIGHT KNEE: ICD-10-CM

## 2024-09-19 PROCEDURE — 99024 POSTOP FOLLOW-UP VISIT: CPT

## 2024-09-19 PROCEDURE — 20611 DRAIN/INJ JOINT/BURSA W/US: CPT | Mod: 50

## 2024-09-19 NOTE — HISTORY OF PRESENT ILLNESS
[2] : 2 [Synvisc] : Synvisc [de-identified] : #2 bilateral knee synvisc [] : no [de-identified] : 9/12/24 [TWNoteComboBox1] : 20%

## 2024-09-19 NOTE — DISCUSSION/SUMMARY
[de-identified] : General Dx Discussion The patient was advised of the diagnosis. The natural history of the pathology was explained in full to the patient in layman's terms. All questions were answered. The risks and benefits of surgical and non-surgical treatment alternatives were explained in full to the patient.  Case discussed. Synvisc tolerated well. Follow up in 1 week to continue series.  Entered by KIM Martínez acting as scribe. - The documentation recorded by the scribe accurately reflects the service I personally performed and the decisions made by me.

## 2024-10-04 ENCOUNTER — APPOINTMENT (OUTPATIENT)
Dept: ORTHOPEDIC SURGERY | Facility: CLINIC | Age: 69
End: 2024-10-04

## 2024-10-04 VITALS — HEIGHT: 63 IN | BODY MASS INDEX: 34.02 KG/M2 | WEIGHT: 192 LBS

## 2024-10-04 DIAGNOSIS — M17.11 UNILATERAL PRIMARY OSTEOARTHRITIS, RIGHT KNEE: ICD-10-CM

## 2024-10-04 DIAGNOSIS — M17.12 UNILATERAL PRIMARY OSTEOARTHRITIS, LEFT KNEE: ICD-10-CM

## 2024-10-04 PROCEDURE — 99024 POSTOP FOLLOW-UP VISIT: CPT

## 2024-10-04 PROCEDURE — 20611 DRAIN/INJ JOINT/BURSA W/US: CPT | Mod: 50

## 2024-10-04 NOTE — PROCEDURE
[Large Joint Injection] : Large joint injection [Left] : of the left [Glenohumeral Joint] : glenohumeral joint [Pain] : pain [X-ray evidence of Osteoarthritis on this or prior visit] : x-ray evidence of Osteoarthritis on this or prior visit [Alcohol] : alcohol [Betadine] : betadine [Ethyl Chloride sprayed topically] : ethyl chloride sprayed topically [Sterile technique used] : sterile technique used [Synvisc (16mg)] : 16mg of Synvisc [#3] : series #3 [] : Patient tolerated procedure well [Call if redness, pain or fever occur] : call if redness, pain or fever occur [Apply ice for 15min out of every hour for the next 12-24 hours as tolerated] : apply ice for 15 minutes out of every hour for the next 12-24 hours as tolerated [Previous OTC use and PT nontherapeutic] : patient has tried OTC's including aspirin, Ibuprofen, Aleve, etc or prescription NSAIDS, and/or exercises at home and/or physical therapy without satisfactory response [Patient had decreased mobility in the joint] : patient had decreased mobility in the joint [Risks, benefits, alternatives discussed / Verbal consent obtained] : the risks benefits, and alternatives have been discussed, and verbal consent was obtained [Glenohmeral injection] : glenohumeral injection [All ultrasound images have been permanently captured and stored accordingly in our picture archiving and communication system] : All ultrasound images have been permanently captured and stored accordingly in our picture archiving and communication system [Visualization of the needle and placement of injection was performed without complication] : visualization of the needle and placement of injection was performed without complication

## 2024-10-04 NOTE — DISCUSSION/SUMMARY
[de-identified] : General Dx Discussion The patient was advised of the diagnosis. The natural history of the pathology was explained in full to the patient in layman's terms. All questions were answered. The risks and benefits of surgical and non-surgical treatment alternatives were explained in full to the patient.  Case discussed. Synvisc tolerated well. Follow up as needed.    Entered by KIM Allen acting as scribe. - The documentation recorded by the scribe accurately reflects the service I personally performed and the decisions made by me.

## 2024-10-04 NOTE — HISTORY OF PRESENT ILLNESS
[3] : 3 [Synvisc] : Synvisc [de-identified] : #  3 bilateral knee Synvisc [] : no [de-identified] : 9/12/24 [de-identified] : bl knees [TWNoteComboBox1] : 20%